# Patient Record
Sex: MALE | Race: OTHER | HISPANIC OR LATINO | URBAN - METROPOLITAN AREA
[De-identification: names, ages, dates, MRNs, and addresses within clinical notes are randomized per-mention and may not be internally consistent; named-entity substitution may affect disease eponyms.]

---

## 2022-05-26 ENCOUNTER — OUTPATIENT (OUTPATIENT)
Dept: OUTPATIENT SERVICES | Facility: HOSPITAL | Age: 32
LOS: 1 days | End: 2022-05-26
Payer: SELF-PAY

## 2022-05-26 DIAGNOSIS — Z20.828 CONTACT WITH AND (SUSPECTED) EXPOSURE TO OTHER VIRAL COMMUNICABLE DISEASES: ICD-10-CM

## 2022-05-26 LAB — SARS-COV-2 RNA SPEC QL NAA+PROBE: DETECTED

## 2022-05-26 PROCEDURE — U0005: CPT

## 2022-05-26 PROCEDURE — U0003: CPT

## 2022-09-02 ENCOUNTER — OFFICE (OUTPATIENT)
Dept: URBAN - METROPOLITAN AREA CLINIC 115 | Facility: CLINIC | Age: 32
Setting detail: OPHTHALMOLOGY
End: 2022-09-02
Payer: COMMERCIAL

## 2022-09-02 ENCOUNTER — RX ONLY (RX ONLY)
Age: 32
End: 2022-09-02

## 2022-09-02 DIAGNOSIS — Z01.00: ICD-10-CM

## 2022-09-02 PROCEDURE — 92002 INTRM OPH EXAM NEW PATIENT: CPT | Performed by: OPTOMETRIST

## 2022-09-02 ASSESSMENT — REFRACTION_MANIFEST
OS_AXIS: 089
OS_CYLINDER: -0.50
OD_AXIS: 142
OD_CYLINDER: -0.25
OD_SPHERE: +0.25
OD_VA1: 20/20
OS_VA1: 20/20
OS_SPHERE: PLANO

## 2022-09-02 ASSESSMENT — VISUAL ACUITY
OD_BCVA: 20/20-
OS_BCVA: 20/20-

## 2022-09-02 ASSESSMENT — TONOMETRY
OD_IOP_MMHG: 17
OS_IOP_MMHG: 15

## 2022-09-02 ASSESSMENT — REFRACTION_AUTOREFRACTION
OD_AXIS: 142
OD_SPHERE: 0.00
OD_CYLINDER: -0.25
OS_CYLINDER: -0.50
OS_AXIS: 089
OS_SPHERE: 0.00

## 2022-09-02 ASSESSMENT — SPHEQUIV_DERIVED
OD_SPHEQUIV: -0.125
OS_SPHEQUIV: -0.25
OD_SPHEQUIV: 0.125

## 2022-09-02 ASSESSMENT — CONFRONTATIONAL VISUAL FIELD TEST (CVF)
OS_FINDINGS: FULL
OD_FINDINGS: FULL

## 2022-09-12 ENCOUNTER — OFFICE (OUTPATIENT)
Dept: URBAN - METROPOLITAN AREA CLINIC 115 | Facility: CLINIC | Age: 32
Setting detail: OPHTHALMOLOGY
End: 2022-09-12
Payer: COMMERCIAL

## 2022-09-12 DIAGNOSIS — H11.053: ICD-10-CM

## 2022-09-12 PROBLEM — Z01.00 ENCOUNTER FOR EXAMINATION OF EYES AND VISION WITHOUT ABNORMAL FINDINGS: Status: ACTIVE | Noted: 2022-09-02

## 2022-09-12 PROCEDURE — 99213 OFFICE O/P EST LOW 20 MIN: CPT | Performed by: OPHTHALMOLOGY

## 2022-09-12 ASSESSMENT — SPHEQUIV_DERIVED
OD_SPHEQUIV: 0.125
OS_SPHEQUIV: 0.125
OD_SPHEQUIV: -0.125

## 2022-09-12 ASSESSMENT — TONOMETRY
OS_IOP_MMHG: 15
OD_IOP_MMHG: 14

## 2022-09-12 ASSESSMENT — REFRACTION_MANIFEST
OD_CYLINDER: -0.25
OD_SPHERE: +0.25
OD_VA1: 20/20
OS_VA1: 20/20
OS_SPHERE: PLANO
OD_AXIS: 142
OS_CYLINDER: -0.50
OS_AXIS: 089

## 2022-09-12 ASSESSMENT — VISUAL ACUITY
OS_BCVA: 20/20-
OD_BCVA: 20/25

## 2022-09-12 ASSESSMENT — REFRACTION_AUTOREFRACTION
OD_SPHERE: 0.00
OS_SPHERE: +0.50
OD_AXIS: 087
OS_CYLINDER: -0.75
OD_CYLINDER: -0.25
OS_AXIS: 084

## 2022-09-20 ENCOUNTER — OFFICE (OUTPATIENT)
Dept: URBAN - METROPOLITAN AREA CLINIC 94 | Facility: CLINIC | Age: 32
Setting detail: OPHTHALMOLOGY
End: 2022-09-20
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
## 2022-09-20 PROCEDURE — NO SHOW FE NO SHOW FEE: Performed by: OPHTHALMOLOGY

## 2022-12-09 ENCOUNTER — EMERGENCY (EMERGENCY)
Facility: HOSPITAL | Age: 32
LOS: 1 days | Discharge: HOME | End: 2022-12-09
Attending: EMERGENCY MEDICINE
Payer: COMMERCIAL

## 2022-12-09 VITALS
HEART RATE: 87 BPM | RESPIRATION RATE: 18 BRPM | DIASTOLIC BLOOD PRESSURE: 85 MMHG | OXYGEN SATURATION: 97 % | SYSTOLIC BLOOD PRESSURE: 129 MMHG

## 2022-12-09 VITALS
WEIGHT: 235.89 LBS | SYSTOLIC BLOOD PRESSURE: 153 MMHG | DIASTOLIC BLOOD PRESSURE: 101 MMHG | RESPIRATION RATE: 18 BRPM | HEART RATE: 90 BPM | HEIGHT: 69 IN | TEMPERATURE: 98 F | OXYGEN SATURATION: 96 %

## 2022-12-09 PROCEDURE — 99283 EMERGENCY DEPT VISIT LOW MDM: CPT

## 2022-12-09 RX ORDER — GLYCERIN 1 %
2 DROPS OPHTHALMIC (EYE)
Qty: 1 | Refills: 0
Start: 2022-12-09 | End: 2022-12-13

## 2022-12-09 RX ORDER — KETOROLAC TROMETHAMINE 0.5 %
1 DROPS OPHTHALMIC (EYE)
Qty: 1 | Refills: 0
Start: 2022-12-09 | End: 2022-12-13

## 2022-12-09 RX ORDER — OFLOXACIN 0.3 %
2 DROPS OPHTHALMIC (EYE)
Refills: 0 | Status: DISCONTINUED | OUTPATIENT
Start: 2022-12-09 | End: 2022-12-16

## 2022-12-09 RX ADMIN — Medication 2 DROP(S): at 14:14

## 2022-12-09 NOTE — ED PROVIDER NOTE - OBJECTIVE STATEMENT
32M with PMHx of Pterygium in bilateral eyes c/o inflammation and pain to the right eye with associated redness to the bilateral eyes. Patient states he woke up this morning to his right eye being inflamed with pain and associated watery discharge as well as blurry vision. Currently, patient describes the pain as a throbbing pain, and denies current vision problems. States his vision is normal. Denies inflammation and pain to the left eye, but c/o redness to the left eye. Patient states he has been trying Visine and the inflammation and pain has decreased since this morning but has not gone away. Denies any trauma to the eyes, CP, SOB, abd pain, n/v/d/c, LOC, HA. Denies any change in eating habits, detergent, sheets, or environmental causes.    Patient states he was diagnosed with bilateral Pterygium 3 months ago and was supposed to follow-up to have surgery but missed the appt due to work and the holidays. Does not wear glasses or contacts.     Face-to-face Estonian translation used.
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
## 2022-12-09 NOTE — ED PROVIDER NOTE - CARE PROVIDER_API CALL
Yeny Aguilera)  Ophthalmology  [address], [address]  [place] [address]  Phone: [phone]  Fax: [phone]  Follow Up Time:

## 2022-12-09 NOTE — ED PROVIDER NOTE - PROGRESS NOTE DETAILS
Discussed the usage of the medication given in the ED and the two prescriptions to the pharmacy. Discussed follow-up with opthalmology and PCP, and when to return to the ER if needed. Patient verbalized understanding.

## 2022-12-09 NOTE — ED PROVIDER NOTE - NSFOLLOWUPINSTRUCTIONS_ED_ALL_ED_FT
Ofloxacin: 1-2 drops in each eye 4 times a day for 5 days. Conjuntivitis    La conjuntivitis es leonie inflamación de la membrana transparente que cubre la parte valentin del kisha y la superficie interna del párpado (conjuntiva). Los síntomas incluyen enrojecimiento de los ojos, dolor en los ojos, lagrimeo y drenaje, formación de costras en los párpados, párpados hinchados y sensibilidad a la alon. La conjuntivitis puede ser contagiosa y propagarse fácilmente de persona a persona. Puede ser causada por un virus, leonie bacteria o becky parte de leonie reacción alérgica; el tratamiento depende del tipo de conjuntivitis sospechada. Evite tocarse o frotarse los ojos y limpie cualquier drenaje suavemente con un paño húmedo y tibio. No use lentes de contacto hasta que la inflamación desaparezca; use anteojos hasta que pfeiffer proveedor de atención médica le diga que es seguro volver a usar lentes de contacto. No comparta toallas o paños que puedan propagar la infección y lávese las kunal con frecuencia.    BUSQUE ATENCIÓN MÉDICA INMEDIATA SI TIENE ALGUNO DE LOS SIGUIENTES SÍNTOMAS: aumento del dolor, visión borrosa, ceguera, fiebre o dolor/enrojecimiento/hinchazón facial.    Seguimiento con pfeiffer médico dentro de 1-2 días.  Seguimiento con oftalmología dentro de 1-2 días.    Le dieron gotas oftálmicas de ofloxacina en el departamento de emergencias. Use 1-2 gotas en cada kisha 4 veces al día naina 5 días.    Le dieron otros dos medicamentos recetados para los ojos que están en la farmacia:  Use Ketoralac: 1 gota en cada kisha 4 veces al día naina 5 días.  Use Opcon-A: 2 gotas en cada kisha 4 veces al día naina 5 días.

## 2022-12-09 NOTE — ED ADULT NURSE NOTE - OBJECTIVE STATEMENT
pt a&ox3, vss, pt c/o bilateral eye pain and redness. pt denies any visual changes or ha. respirations even and unlabored. POC discussed w/ patient. will continue to reassess

## 2022-12-09 NOTE — ED PROVIDER NOTE - ATTENDING APP SHARED VISIT CONTRIBUTION OF CARE
b/l conjunctivitis; VA normal; EOMI; PERRLA; gtts as ordered via Rx, agree with acp plan of care    This was a shared visit with CASSANDRA. I reviewed and verified the documentation and independently performed the documented history/exam/mdm.

## 2022-12-09 NOTE — ED PROVIDER NOTE - NS ED ROS FT
Eyes: +inflammation, pain, redness, and watery discharge from the right eye since this morning.  +redness to the left eye since this morning.  Reported blurry vision this morning upon waking, but denies current blurry vision and double vision.

## 2022-12-09 NOTE — ED PROVIDER NOTE - PATIENT PORTAL LINK FT
You can access the FollowMyHealth Patient Portal offered by [facility] by registering at the following website: [email or web address]. By joining [employer]’s FollowMyHealth portal, you will also be able to view your health information using other applications (apps) compatible with our system.

## 2022-12-09 NOTE — ED PROVIDER NOTE - PHYSICAL EXAMINATION
Eyes: PERRL bilaterally. Optic disc visualized in bilateral eyes.  +right orbital inflammation. +pterygium to the right and left eye. +erythema to the right and left eyeball.  (-) bilateral eyelid swelling, bilateral eye swelling, corneal ulcers, lacrimal drainage, foreign body. Eyes: PERRL bilaterally. Optic disc visualized in bilateral eyes.  +right orbital inflammation. +pterygium to the right and left eye. +conjunctivitis to the right and left eyeball.  (-) bilateral eyelid swelling, bilateral eye swelling, corneal ulcers, lacrimal drainage, foreign body.

## 2023-01-27 ENCOUNTER — OFFICE (OUTPATIENT)
Dept: URBAN - METROPOLITAN AREA CLINIC 94 | Facility: CLINIC | Age: 33
Setting detail: OPHTHALMOLOGY
End: 2023-01-27
Payer: COMMERCIAL

## 2023-01-27 DIAGNOSIS — H11.043: ICD-10-CM

## 2023-01-27 DIAGNOSIS — H04.122: ICD-10-CM

## 2023-01-27 DIAGNOSIS — H04.121: ICD-10-CM

## 2023-01-27 DIAGNOSIS — H04.123: ICD-10-CM

## 2023-01-27 PROCEDURE — 83861 MICROFLUID ANALY TEARS: CPT | Performed by: OPHTHALMOLOGY

## 2023-01-27 PROCEDURE — 92285 EXTERNAL OCULAR PHOTOGRAPHY: CPT | Performed by: OPHTHALMOLOGY

## 2023-01-27 PROCEDURE — 99214 OFFICE O/P EST MOD 30 MIN: CPT | Performed by: OPHTHALMOLOGY

## 2023-01-27 ASSESSMENT — KERATOMETRY: METHOD_AUTO_MANUAL: AUTO

## 2023-01-27 ASSESSMENT — REFRACTION_MANIFEST
OS_SPHERE: PLANO
OD_SPHERE: +0.25
OD_AXIS: 142
OD_CYLINDER: -0.25
OS_VA1: 20/20
OS_AXIS: 089
OS_CYLINDER: -0.50
OD_VA1: 20/20

## 2023-01-27 ASSESSMENT — CORNEAL PTERYGIUM
OS_PTERYGIUM: NASAL 1MM
OD_PTERYGIUM: NASAL 2MM

## 2023-01-27 ASSESSMENT — SPHEQUIV_DERIVED
OD_SPHEQUIV: 0.125
OS_SPHEQUIV: 0.125
OD_SPHEQUIV: -0.125

## 2023-01-27 ASSESSMENT — REFRACTION_AUTOREFRACTION
OS_CYLINDER: -0.75
OD_AXIS: 087
OS_SPHERE: +0.50
OD_CYLINDER: -0.25
OD_SPHERE: 0.00
OS_AXIS: 084

## 2023-01-27 ASSESSMENT — VISUAL ACUITY
OS_BCVA: 20/20-1
OD_BCVA: 20/20

## 2023-01-27 ASSESSMENT — TONOMETRY
OD_IOP_MMHG: 14
OS_IOP_MMHG: 14

## 2023-01-27 ASSESSMENT — CONFRONTATIONAL VISUAL FIELD TEST (CVF)
OD_FINDINGS: FULL
OS_FINDINGS: FULL

## 2023-04-17 ENCOUNTER — ASC (OUTPATIENT)
Dept: URBAN - METROPOLITAN AREA SURGERY 8 | Facility: SURGERY | Age: 33
Setting detail: OPHTHALMOLOGY
End: 2023-04-17
Payer: COMMERCIAL

## 2023-04-17 DIAGNOSIS — H11.041: ICD-10-CM

## 2023-04-17 PROCEDURE — 65426 REMOVAL OF EYE LESION: CPT | Performed by: OPHTHALMOLOGY

## 2023-04-18 ENCOUNTER — RX ONLY (RX ONLY)
Age: 33
End: 2023-04-18

## 2023-04-18 ENCOUNTER — OFFICE (OUTPATIENT)
Dept: URBAN - METROPOLITAN AREA CLINIC 94 | Facility: CLINIC | Age: 33
Setting detail: OPHTHALMOLOGY
End: 2023-04-18
Payer: COMMERCIAL

## 2023-04-18 DIAGNOSIS — H11.041: ICD-10-CM

## 2023-04-18 PROCEDURE — 99024 POSTOP FOLLOW-UP VISIT: CPT | Performed by: OPHTHALMOLOGY

## 2023-04-18 ASSESSMENT — VISUAL ACUITY
OD_BCVA: 20/20
OS_BCVA: 20/20

## 2023-04-18 ASSESSMENT — REFRACTION_MANIFEST
OS_VA1: 20/20
OD_VA1: 20/20
OS_SPHERE: PLANO
OS_CYLINDER: -0.50
OS_AXIS: 089
OD_CYLINDER: -0.25
OD_AXIS: 142
OD_SPHERE: +0.25

## 2023-04-18 ASSESSMENT — SPHEQUIV_DERIVED
OD_SPHEQUIV: 0.125
OS_SPHEQUIV: 0.125
OD_SPHEQUIV: -0.125

## 2023-04-18 ASSESSMENT — CONFRONTATIONAL VISUAL FIELD TEST (CVF)
OS_FINDINGS: FULL
OD_FINDINGS: FULL

## 2023-04-18 ASSESSMENT — REFRACTION_AUTOREFRACTION
OS_CYLINDER: -0.75
OD_AXIS: 087
OD_SPHERE: 0.00
OD_CYLINDER: -0.25
OS_SPHERE: +0.50
OS_AXIS: 084

## 2023-04-18 ASSESSMENT — CORNEAL PTERYGIUM
OD_PTERYGIUM: NASAL 2MM
OS_PTERYGIUM: NASAL 1MM

## 2023-04-18 ASSESSMENT — KERATOMETRY: METHOD_AUTO_MANUAL: AUTO

## 2023-05-03 ENCOUNTER — OFFICE (OUTPATIENT)
Dept: URBAN - METROPOLITAN AREA CLINIC 94 | Facility: CLINIC | Age: 33
Setting detail: OPHTHALMOLOGY
End: 2023-05-03
Payer: COMMERCIAL

## 2023-05-03 DIAGNOSIS — H11.31: ICD-10-CM

## 2023-05-03 DIAGNOSIS — H11.041: ICD-10-CM

## 2023-05-03 PROCEDURE — 99024 POSTOP FOLLOW-UP VISIT: CPT | Performed by: PHYSICIAN ASSISTANT

## 2023-05-03 ASSESSMENT — VISUAL ACUITY
OD_BCVA: 20/25
OS_BCVA: 20/25

## 2023-05-03 ASSESSMENT — TONOMETRY
OS_IOP_MMHG: 18
OD_IOP_MMHG: 17

## 2023-05-03 ASSESSMENT — REFRACTION_AUTOREFRACTION
OD_SPHERE: +0.75
OD_AXIS: 126
OS_AXIS: 099
OD_CYLINDER: -0.75
OS_CYLINDER: -0.75
OS_SPHERE: +0.25

## 2023-05-03 ASSESSMENT — CONFRONTATIONAL VISUAL FIELD TEST (CVF)
OD_FINDINGS: FULL
OS_FINDINGS: FULL

## 2023-05-03 ASSESSMENT — CORNEAL PTERYGIUM
OD_PTERYGIUM: NASAL 2MM
OS_PTERYGIUM: NASAL 1MM

## 2023-05-03 ASSESSMENT — REFRACTION_MANIFEST
OD_SPHERE: +0.25
OS_CYLINDER: -0.50
OD_CYLINDER: -0.25
OD_VA1: 20/20
OS_SPHERE: PLANO
OS_VA1: 20/20
OS_AXIS: 089
OD_AXIS: 142

## 2023-05-03 ASSESSMENT — SPHEQUIV_DERIVED
OS_SPHEQUIV: -0.125
OD_SPHEQUIV: 0.125
OD_SPHEQUIV: 0.375

## 2023-05-03 ASSESSMENT — KERATOMETRY: METHOD_AUTO_MANUAL: AUTO

## 2023-05-10 ENCOUNTER — ASC (OUTPATIENT)
Dept: URBAN - METROPOLITAN AREA SURGERY 8 | Facility: SURGERY | Age: 33
Setting detail: OPHTHALMOLOGY
End: 2023-05-10
Payer: COMMERCIAL

## 2023-05-10 DIAGNOSIS — H11.042: ICD-10-CM

## 2023-05-10 PROCEDURE — 65426 REMOVAL OF EYE LESION: CPT | Performed by: OPHTHALMOLOGY

## 2023-05-11 ENCOUNTER — OFFICE (OUTPATIENT)
Dept: URBAN - METROPOLITAN AREA CLINIC 112 | Facility: CLINIC | Age: 33
Setting detail: OPHTHALMOLOGY
End: 2023-05-11
Payer: COMMERCIAL

## 2023-05-11 DIAGNOSIS — H11.043: ICD-10-CM

## 2023-05-11 PROCEDURE — 99024 POSTOP FOLLOW-UP VISIT: CPT | Performed by: PHYSICIAN ASSISTANT

## 2023-05-11 ASSESSMENT — REFRACTION_MANIFEST
OD_VA1: 20/20
OD_AXIS: 142
OS_VA1: 20/20
OS_AXIS: 089
OS_CYLINDER: -0.50
OS_SPHERE: PLANO
OD_CYLINDER: -0.25
OD_SPHERE: +0.25

## 2023-05-11 ASSESSMENT — REFRACTION_AUTOREFRACTION
OS_CYLINDER: -0.75
OS_SPHERE: +0.25
OS_AXIS: 099
OD_SPHERE: +0.75
OD_CYLINDER: -0.75
OD_AXIS: 126

## 2023-05-11 ASSESSMENT — CONFRONTATIONAL VISUAL FIELD TEST (CVF)
OS_FINDINGS: FULL
OD_FINDINGS: FULL

## 2023-05-11 ASSESSMENT — SPHEQUIV_DERIVED
OS_SPHEQUIV: -0.125
OD_SPHEQUIV: 0.375
OD_SPHEQUIV: 0.125

## 2023-05-11 ASSESSMENT — VISUAL ACUITY
OS_BCVA: 20/25
OD_BCVA: 20/25

## 2023-05-11 ASSESSMENT — KERATOMETRY: METHOD_AUTO_MANUAL: AUTO

## 2023-05-16 ENCOUNTER — RX ONLY (RX ONLY)
Age: 33
End: 2023-05-16

## 2023-05-16 ENCOUNTER — OFFICE (OUTPATIENT)
Dept: URBAN - METROPOLITAN AREA CLINIC 94 | Facility: CLINIC | Age: 33
Setting detail: OPHTHALMOLOGY
End: 2023-05-16
Payer: COMMERCIAL

## 2023-05-16 DIAGNOSIS — H11.043: ICD-10-CM

## 2023-05-16 DIAGNOSIS — L98.0: ICD-10-CM

## 2023-05-16 DIAGNOSIS — H11.32: ICD-10-CM

## 2023-05-16 PROBLEM — H40.043 STEROID RESPONDER; BOTH EYES: Status: ACTIVE | Noted: 2023-05-16

## 2023-05-16 PROCEDURE — 99024 POSTOP FOLLOW-UP VISIT: CPT | Performed by: OPHTHALMOLOGY

## 2023-05-16 ASSESSMENT — REFRACTION_MANIFEST
OS_VA1: 20/20
OS_SPHERE: PLANO
OS_CYLINDER: -0.50
OD_SPHERE: +0.25
OS_AXIS: 089
OD_AXIS: 142
OD_CYLINDER: -0.25
OD_VA1: 20/20

## 2023-05-16 ASSESSMENT — REFRACTION_AUTOREFRACTION
OS_AXIS: 099
OD_CYLINDER: -0.75
OD_SPHERE: +0.75
OD_AXIS: 126
OS_SPHERE: +0.25
OS_CYLINDER: -0.75

## 2023-05-16 ASSESSMENT — SPHEQUIV_DERIVED
OD_SPHEQUIV: 0.375
OD_SPHEQUIV: 0.125
OS_SPHEQUIV: -0.125

## 2023-05-16 ASSESSMENT — CONFRONTATIONAL VISUAL FIELD TEST (CVF)
OD_FINDINGS: FULL
OS_FINDINGS: FULL

## 2023-05-16 ASSESSMENT — KERATOMETRY: METHOD_AUTO_MANUAL: AUTO

## 2023-05-16 ASSESSMENT — VISUAL ACUITY
OD_BCVA: 20/40
OS_BCVA: 20/25-1

## 2023-05-16 ASSESSMENT — TONOMETRY: OS_IOP_MMHG: 18

## 2023-06-05 ENCOUNTER — OFFICE (OUTPATIENT)
Dept: URBAN - METROPOLITAN AREA CLINIC 94 | Facility: CLINIC | Age: 33
Setting detail: OPHTHALMOLOGY
End: 2023-06-05
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
## 2023-06-05 PROCEDURE — NO SHOW FE NO SHOW FEE: Performed by: PHYSICIAN ASSISTANT

## 2023-06-07 ENCOUNTER — RX ONLY (RX ONLY)
Age: 33
End: 2023-06-07

## 2023-06-07 ENCOUNTER — OFFICE (OUTPATIENT)
Dept: URBAN - METROPOLITAN AREA CLINIC 94 | Facility: CLINIC | Age: 33
Setting detail: OPHTHALMOLOGY
End: 2023-06-07
Payer: COMMERCIAL

## 2023-06-07 DIAGNOSIS — L98.0: ICD-10-CM

## 2023-06-07 DIAGNOSIS — H40.043: ICD-10-CM

## 2023-06-07 DIAGNOSIS — H11.043: ICD-10-CM

## 2023-06-07 PROCEDURE — 99024 POSTOP FOLLOW-UP VISIT: CPT | Performed by: PHYSICIAN ASSISTANT

## 2023-06-07 ASSESSMENT — KERATOMETRY
OS_K1POWER_DIOPTERS: 43.25
OD_AXISANGLE_DEGREES: 067
OS_K2POWER_DIOPTERS: 43.50
OD_K2POWER_DIOPTERS: 43.75
METHOD_AUTO_MANUAL: AUTO
OD_K1POWER_DIOPTERS: 43.00
OS_AXISANGLE_DEGREES: 060

## 2023-06-07 ASSESSMENT — PACHYMETRY
OS_CT_CORRECTION: -2
OD_CT_CORRECTION: -1
OS_CT_UM: 571
OD_CT_UM: 557

## 2023-06-07 ASSESSMENT — AXIALLENGTH_DERIVED
OS_AL: 23.6869
OD_AL: 23.5891
OD_AL: 23.6869

## 2023-06-07 ASSESSMENT — REFRACTION_MANIFEST
OD_SPHERE: +0.25
OS_AXIS: 089
OS_SPHERE: PLANO
OS_CYLINDER: -0.50
OD_CYLINDER: -0.25
OD_AXIS: 142
OD_VA1: 20/20
OS_VA1: 20/20

## 2023-06-07 ASSESSMENT — REFRACTION_AUTOREFRACTION
OD_AXIS: 134
OS_CYLINDER: -0.25
OS_AXIS: 088
OD_CYLINDER: -0.75
OD_SPHERE: +0.25
OS_SPHERE: 0.00

## 2023-06-07 ASSESSMENT — SPHEQUIV_DERIVED
OD_SPHEQUIV: 0.125
OS_SPHEQUIV: -0.125
OD_SPHEQUIV: -0.125

## 2023-06-07 ASSESSMENT — VISUAL ACUITY
OS_BCVA: 20/25-1
OD_BCVA: 20/40

## 2023-06-07 ASSESSMENT — CONFRONTATIONAL VISUAL FIELD TEST (CVF)
OS_FINDINGS: FULL
OD_FINDINGS: FULL

## 2023-06-07 ASSESSMENT — TONOMETRY
OS_IOP_MMHG: 21
OD_IOP_MMHG: 21

## 2023-08-30 ENCOUNTER — OFFICE (OUTPATIENT)
Dept: URBAN - METROPOLITAN AREA CLINIC 94 | Facility: CLINIC | Age: 33
Setting detail: OPHTHALMOLOGY
End: 2023-08-30
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
## 2023-08-30 PROCEDURE — NO SHOW FE NO SHOW FEE: Performed by: PHYSICIAN ASSISTANT

## 2024-07-23 ENCOUNTER — EMERGENCY (EMERGENCY)
Facility: HOSPITAL | Age: 34
LOS: 1 days | Discharge: HOME | End: 2024-07-23
Attending: EMERGENCY MEDICINE
Payer: SELF-PAY

## 2024-07-23 VITALS
SYSTOLIC BLOOD PRESSURE: 159 MMHG | HEIGHT: 69 IN | HEART RATE: 96 BPM | OXYGEN SATURATION: 96 % | WEIGHT: 220.02 LBS | DIASTOLIC BLOOD PRESSURE: 84 MMHG | TEMPERATURE: 98 F | RESPIRATION RATE: 18 BRPM

## 2024-07-23 VITALS
HEART RATE: 75 BPM | DIASTOLIC BLOOD PRESSURE: 79 MMHG | OXYGEN SATURATION: 97 % | SYSTOLIC BLOOD PRESSURE: 124 MMHG | TEMPERATURE: 97 F | RESPIRATION RATE: 18 BRPM

## 2024-07-23 LAB
ALBUMIN SERPL ELPH-MCNC: 4.2 G/DL — SIGNIFICANT CHANGE UP (ref 3.3–5.2)
ALP SERPL-CCNC: 87 U/L — SIGNIFICANT CHANGE UP (ref 40–120)
ALT FLD-CCNC: 40 U/L — SIGNIFICANT CHANGE UP
ANION GAP SERPL CALC-SCNC: 17 MMOL/L — SIGNIFICANT CHANGE UP (ref 5–17)
AST SERPL-CCNC: 25 U/L — SIGNIFICANT CHANGE UP
BILIRUB SERPL-MCNC: 0.7 MG/DL — SIGNIFICANT CHANGE UP (ref 0.4–2)
BUN SERPL-MCNC: 12.8 MG/DL — SIGNIFICANT CHANGE UP (ref 8–20)
CALCIUM SERPL-MCNC: 8.9 MG/DL — SIGNIFICANT CHANGE UP (ref 8.4–10.5)
CHLORIDE SERPL-SCNC: 98 MMOL/L — SIGNIFICANT CHANGE UP (ref 96–108)
CO2 SERPL-SCNC: 23 MMOL/L — SIGNIFICANT CHANGE UP (ref 22–29)
CREAT SERPL-MCNC: 0.68 MG/DL — SIGNIFICANT CHANGE UP (ref 0.5–1.3)
EGFR: 125 ML/MIN/1.73M2 — SIGNIFICANT CHANGE UP
GLUCOSE SERPL-MCNC: 133 MG/DL — HIGH (ref 70–99)
HCT VFR BLD CALC: 46.5 % — SIGNIFICANT CHANGE UP (ref 39–50)
HGB BLD-MCNC: 16.6 G/DL — SIGNIFICANT CHANGE UP (ref 13–17)
MCHC RBC-ENTMCNC: 28.3 PG — SIGNIFICANT CHANGE UP (ref 27–34)
MCHC RBC-ENTMCNC: 35.7 GM/DL — SIGNIFICANT CHANGE UP (ref 32–36)
MCV RBC AUTO: 79.2 FL — LOW (ref 80–100)
PLATELET # BLD AUTO: 271 K/UL — SIGNIFICANT CHANGE UP (ref 150–400)
POTASSIUM SERPL-MCNC: 3.8 MMOL/L — SIGNIFICANT CHANGE UP (ref 3.5–5.3)
POTASSIUM SERPL-SCNC: 3.8 MMOL/L — SIGNIFICANT CHANGE UP (ref 3.5–5.3)
PROT SERPL-MCNC: 7.4 G/DL — SIGNIFICANT CHANGE UP (ref 6.6–8.7)
RBC # BLD: 5.87 M/UL — HIGH (ref 4.2–5.8)
RBC # FLD: 13.2 % — SIGNIFICANT CHANGE UP (ref 10.3–14.5)
SODIUM SERPL-SCNC: 138 MMOL/L — SIGNIFICANT CHANGE UP (ref 135–145)
WBC # BLD: 12.03 K/UL — HIGH (ref 3.8–10.5)
WBC # FLD AUTO: 12.03 K/UL — HIGH (ref 3.8–10.5)

## 2024-07-23 PROCEDURE — 99285 EMERGENCY DEPT VISIT HI MDM: CPT

## 2024-07-23 PROCEDURE — 71045 X-RAY EXAM CHEST 1 VIEW: CPT | Mod: 26

## 2024-07-23 PROCEDURE — 93010 ELECTROCARDIOGRAM REPORT: CPT

## 2024-07-23 RX ORDER — LIDOCAINE HCL 28 MG/G
1 GEL TOPICAL ONCE
Refills: 0 | Status: COMPLETED | OUTPATIENT
Start: 2024-07-23 | End: 2024-07-23

## 2024-07-23 RX ORDER — METHOCARBAMOL 500 MG
1500 TABLET ORAL ONCE
Refills: 0 | Status: COMPLETED | OUTPATIENT
Start: 2024-07-23 | End: 2024-07-23

## 2024-07-23 RX ORDER — ACETAMINOPHEN 325 MG
975 TABLET ORAL ONCE
Refills: 0 | Status: COMPLETED | OUTPATIENT
Start: 2024-07-23 | End: 2024-07-23

## 2024-07-23 RX ADMIN — Medication 975 MILLIGRAM(S): at 20:58

## 2024-07-23 RX ADMIN — Medication 1500 MILLIGRAM(S): at 20:58

## 2024-07-23 RX ADMIN — LIDOCAINE HCL 1 PATCH: 28 GEL TOPICAL at 20:59

## 2024-07-23 NOTE — ED PROVIDER NOTE - NSFOLLOWUPINSTRUCTIONS_ED_ALL_ED_FT
- Prescription sent to pharmacy.  - Ibuprofen 600mg every 6 hours as needed for pain.  - Acetaminophen 650mg every 6 hours as needed for pain.   - Please bring all documentation from your ED visit to any related future follow up appointment.  - Please call to schedule follow up appointment with your primary care physician within 24-48 hours.  - Please seek immediate medical attention for any new/worsening, signs/symptoms, or concerns.    Feel better!    - Receta enviada a farmacia.  - Ibuprofeno 600 mg cada 6 horas según sea necesario para el dolor.  - Acetaminofén 650 mg cada 6 horas según sea necesario para el dolor.   - Traiga toda la documentación de pfeiffer visita al servicio de urgencias a cualquier ant de seguimiento futura relacionada.  - Llame para programar leonie ant de seguimiento con pfeiffer médico de atención primaria dentro de las 24 a 48 horas.  - Busque atención médica inmediata ante cualquier signo/síntoma o inquietud nueva/empeoramiento.    ¡Sentirse mejor!    Accidente automovilístico    LO QUE NECESITA SABER:    Los accidentes automovilísticos pueden causar lesiones ocasionadas por el impacto o por pankaj sido movido de un lado al otro dentro del javier. Podría tener un hematoma en el abdomen, pecho o kiel debido al cinturón de seguridad. También puede que tenga dolor en pfeiffer ela, kiel o espalda. Podría sentir dolor en las rodillas, caderas o muslos si pfeiffer cuerpo golpea el tablero o el volante. El dolor muscular tiende a empeorar de 1 a 2 días después del accidente.    INSTRUCCIONES SOBRE EL JESUS HOSPITALARIA:    Llame al número de emergencias local (911 en los [place]) si:  •Usted tiene un nuevo dolor de pecho o éste empeora, o tiene falta de aliento.          Llame a pfeiffer médico si:  •Usted tiene un dolor nuevo o peor en el abdomen.      •Usted tiene náuseas y vómitos que no mejoran.      •Usted tiene un kiesha dolor de anel.      •Usted tiene debilidad, hormigueo o adormecimiento en humza brazos o piernas.      •Usted tiene un dolor nuevo o peor que le dificulta el movimiento.      •Usted tiene dolor que aparece de 2 a 3 días después del accidente.      •Usted tiene preguntas o inquietudes acerca de pfeiffer condición o cuidado.      Medicamentos:  •Analgésicos:Usted podría recibir medicamento para quitarle o reducir el dolor. No espere a que el dolor sea muy intenso para farrukh el medicamento.      •Los SERVANDO,becky el ibuprofeno, ayudan a disminuir la inflamación, el dolor y la fiebre. Mirian medicamento está disponible con o sin leonie receta médica. Los SERVANDO pueden causar sangrado estomacal o problemas renales en ciertas personas. Si usted esta tomando un anticoágulante,  siempre pregunte si los AINEs son seguros para usted. Siempre go la etiqueta de mirian medicamento y siga las instrucciones. No administre mirian medicamento a niños menores de 6 meses de kenneth sin antes obtener la autorización de pfeiffer médico.      •[place] humza medicamentos becky se le haya indicado.Consulte con pfeiffer médico si usted mikhail que pfeiffer medicamento no le está ayudando o si presenta efectos secundarios. Infórmele si es alérgico a algún medicamento. Mantenga leonie lista actualizada de los medicamentos, las vitaminas y los productos herbales que johnathon. Incluya los siguientes datos de los medicamentos: cantidad, frecuencia y motivo de administración. Traiga con usted la lista o los envases de las píldoras a humza citas de seguimiento. Lleve la lista de los medicamentos con usted en farhana de leonie emergencia.      Cuidados personales:  •Use hielo y calor.El hielo ayuda a disminuir la inflamación y el dolor. El hielo también puede contribuir a evitar el daño de los tejidos. Use leonie compresa de hielo o ponga hielo triturado en leonie bolsa de plástico. Cúbrala con leonie toalla y aplíquela al área adolorida por 15 a 20 minutos cada hora o becky se le indique. Después de 2 días, use leonie compresa caliente en el área lesionada. Aplique calor becky se lo recomiende el médico.      •Estire humza músculos cuidadosamente.Tori ejercicios suaves para estirar humza músculos después de pankaj sufrido un accidente automovilístico. Consulte con pfeiffer médico sobre cuáles ejercicios hacer.      Consejos de seguridad:Lo siguiente puede ayudar a prevenir otro accidente automovilístico o a reducir el riesgo de lesiones:   •Use siempre pfeiffer cinturón de seguridad.El uso de pfeiffer cinturón de seguridad ayudará a reducir las lesiones sufridas por accidentes automovilísticos. El cinturón de seguridad debe tener leonie hinds que atraviese pfeiffer pecho y otra que atraviese pfeiffer regazo.      •Siempre coloque a pfeiffer hijo en un asiento de seguridad para niños.Use un asiento de seguridad hecho para pfeiffer edad, altura y peso. Elija un asiento de seguridad que tenga un arnés y un broche. Coloque el asiento de seguridad en la plaza del medio del asiento trasero del automóvil. El asiento de seguridad no debería moverse en ninguna dirección más de 1 pulgada después de ajustarlo. Siga siempre las instrucciones proporcionadas para pfeiffer asiento de seguridad para ayudarle a colocarlo. Las instrucciones también le indicarán cómo sujetar a pfeiffer bassem en el asiento correctamente. Pregúntele a pfeiffer médico sobre más información acerca de los asientos de seguridad para niños.   Asiento de seguridad para niños en automóviles           •Disminuya la velocidad.Maneje pfeiffer javier al límite de velocidad para reducir pfeiffer riesgo de accidentes automovilísticos.      •No maneje si se siente cansado.Usted reacciona más lentamente cuando está cansado. El tiempo de reacción lento aumentará el riesgo de un accidente automovilístico.      •No hable por teléfono ni envíe mensajes de texto mientras maneje.Usted no reaccionará lo suficientemente rápido en leonie emergencia si se distrae con mensajes de texto o conversaciones.      •No consuma drogas ni alcohol antes de manejar.Es probable que se sienta más cansado o tome riesgos que usualmente no tomaría. No maneje después de farrukh medicamentos que le dan sueño. Use un conductor designado o tori arreglos para que lo lleven a pfeiffer casa.      •Ayude a pfeiffer hijo adolescente a convertirse en un conductor seguro.Sea un buen modelo al manejar. Hable con pfeiffer hijo adolescente sobre las maneras de reducir el riesgo de un accidente automovilístico. Estas incluyen no conducir cuando está cansado y no tener distracciones, becky un teléfono. Recuérdele a pfeiffer hijo adolescente que siempre debe ir al límite de velocidad y usar el cinturón de seguridad.      Acuda a humza consultas de control con pfeiffer médico según le indicaron.Anote humza preguntas para que se acuerde de hacerlas naina humza visitas.
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
## 2024-07-23 NOTE — ED PROVIDER NOTE - PATIENT PORTAL LINK FT
You can access the FollowMyHealth Patient Portal offered by [facility] by registering at the following website: [email or web address]. By joining [employer]’s FollowMyHealth portal, you will also be able to view your health information using other applications (apps) compatible with our system.
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
## 2024-07-23 NOTE — ED ADULT NURSE NOTE - NSFALLUNIVINTERV_ED_ALL_ED
Bed/Stretcher in lowest position, wheels locked, appropriate side rails in place/Call bell, personal items and telephone in reach/Instruct patient to call for assistance before getting out of bed/chair/stretcher/Non-slip footwear applied when patient is off stretcher/[place] to call system/Physically safe environment - no spills, clutter or unnecessary equipment/Purposeful proactive rounding/Room/bathroom lighting operational, light cord in reach
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
## 2024-07-23 NOTE — ED PROVIDER NOTE - PROGRESS NOTE DETAILS
KILLIAN Britton: All results including any incidental findings reviewed and discussed with patient and/or guardian. Patient is medically stable for discharge. Patient discharged in stable condition.

## 2024-07-23 NOTE — ED PROVIDER NOTE - CARE PROVIDER_API CALL
Isaac Bhatia  Neurosurgery  [address] [place] [id number]  Phone: [phone]  Fax: [phone]  Follow Up Time:

## 2024-07-24 PROBLEM — Z86.69 PERSONAL HISTORY OF OTHER DISEASES OF THE NERVOUS SYSTEM AND SENSE ORGANS: Chronic | Status: ACTIVE | Noted: 2022-12-09

## 2024-07-24 PROCEDURE — 71260 CT THORAX DX C+: CPT | Mod: 26,MC

## 2024-07-24 PROCEDURE — 93005 ELECTROCARDIOGRAM TRACING: CPT

## 2024-07-24 PROCEDURE — 71045 X-RAY EXAM CHEST 1 VIEW: CPT

## 2024-07-24 PROCEDURE — 72131 CT LUMBAR SPINE W/O DYE: CPT | Mod: 26,MC

## 2024-07-24 PROCEDURE — 80053 COMPREHEN METABOLIC PANEL: CPT

## 2024-07-24 PROCEDURE — 99285 EMERGENCY DEPT VISIT HI MDM: CPT | Mod: 25

## 2024-07-24 PROCEDURE — 36415 COLL VENOUS BLD VENIPUNCTURE: CPT

## 2024-07-24 PROCEDURE — T1013: CPT

## 2024-07-24 PROCEDURE — 74177 CT ABD & PELVIS W/CONTRAST: CPT | Mod: 26,MC

## 2024-07-24 PROCEDURE — 85027 COMPLETE CBC AUTOMATED: CPT

## 2024-07-24 PROCEDURE — 71260 CT THORAX DX C+: CPT | Mod: MC

## 2024-07-24 PROCEDURE — 74177 CT ABD & PELVIS W/CONTRAST: CPT | Mod: MC

## 2024-07-24 RX ORDER — METHOCARBAMOL 500 MG
2 TABLET ORAL
Qty: 30 | Refills: 0
Start: 2024-07-24 | End: 2024-07-28

## 2024-12-23 PROBLEM — Z00.00 ENCOUNTER FOR PREVENTIVE HEALTH EXAMINATION: Status: ACTIVE | Noted: 2024-12-23

## 2024-12-31 ENCOUNTER — APPOINTMENT (OUTPATIENT)
Dept: PULMONOLOGY | Facility: CLINIC | Age: 34
End: 2024-12-31
Payer: COMMERCIAL

## 2024-12-31 VITALS
HEART RATE: 99 BPM | DIASTOLIC BLOOD PRESSURE: 80 MMHG | OXYGEN SATURATION: 95 % | SYSTOLIC BLOOD PRESSURE: 128 MMHG | RESPIRATION RATE: 16 BRPM

## 2024-12-31 VITALS — WEIGHT: 244 LBS | HEIGHT: 68 IN | BODY MASS INDEX: 36.98 KG/M2

## 2024-12-31 DIAGNOSIS — G47.19 OTHER HYPERSOMNIA: ICD-10-CM

## 2024-12-31 DIAGNOSIS — R06.83 SNORING: ICD-10-CM

## 2024-12-31 PROCEDURE — G2211 COMPLEX E/M VISIT ADD ON: CPT | Mod: NC

## 2024-12-31 PROCEDURE — 99204 OFFICE O/P NEW MOD 45 MIN: CPT
